# Patient Record
Sex: MALE | Race: WHITE | NOT HISPANIC OR LATINO | URBAN - METROPOLITAN AREA
[De-identification: names, ages, dates, MRNs, and addresses within clinical notes are randomized per-mention and may not be internally consistent; named-entity substitution may affect disease eponyms.]

---

## 2018-12-27 ENCOUNTER — OFFICE VISIT (OUTPATIENT)
Dept: URBAN - METROPOLITAN AREA CLINIC 45 | Facility: CLINIC | Age: 78
End: 2018-12-27
Payer: MEDICARE

## 2018-12-27 DIAGNOSIS — H10.13 ACUTE ATOPIC CONJUNCTIVITIS, BILATERAL: ICD-10-CM

## 2018-12-27 DIAGNOSIS — E11.3292 TYPE 2 DIAB W MILD NONPRLF DIABETIC RTNOP W/O MACULAR EDEMA, LEFT EYE: Primary | ICD-10-CM

## 2018-12-27 PROCEDURE — 92134 CPTRZ OPH DX IMG PST SGM RTA: CPT | Performed by: OPTOMETRIST

## 2018-12-27 PROCEDURE — 92014 COMPRE OPH EXAM EST PT 1/>: CPT | Performed by: OPTOMETRIST

## 2018-12-27 RX ORDER — OLOPATADINE HYDROCHLORIDE 7 MG/ML
0.7 % SOLUTION OPHTHALMIC
Qty: 1 | Refills: 4 | Status: INACTIVE
Start: 2018-12-27 | End: 2020-08-19

## 2018-12-27 ASSESSMENT — INTRAOCULAR PRESSURE
OD: 20
OS: 21

## 2018-12-27 ASSESSMENT — KERATOMETRY
OS: 45.25
OD: 49.88

## 2018-12-27 ASSESSMENT — VISUAL ACUITY
OD: 20/25
OS: 20/25

## 2018-12-27 NOTE — IMPRESSION/PLAN
Impression: Type 2 diab w mild nonprlf diabetic rtnop w/o macular edema, left eye: Y68.1012. Plan: Diabetes type II: mild background diabetic retinopathy, no signs of neovascularization noted. No treatment necessary at this time. Patient was instructed to monitor vision for sudden changes and to call if visual changes noted. Discussed ocular and systemic benefits of blood sugar control.

## 2018-12-27 NOTE — IMPRESSION/PLAN
Impression: Acute atopic conjunctivitis, bilateral: H10.13. Plan: pt to start Pazeo 1 gtt qam ou. Educated patient on risks of non-compliance, side effects, benefits and anticipated results, pt understands.

## 2020-08-19 ENCOUNTER — NEW PATIENT (OUTPATIENT)
Dept: URBAN - METROPOLITAN AREA CLINIC 56 | Facility: CLINIC | Age: 80
End: 2020-08-19
Payer: COMMERCIAL

## 2020-08-19 DIAGNOSIS — E11.3293 DIABETES MELLITUS TYPE 2 WITH MILD NON-PROLIFERATI: ICD-10-CM

## 2020-08-19 DIAGNOSIS — H40.023 OPEN ANGLE WITH BORDERLINE FINDINGS, HIGH RISK, BILATERAL: Primary | ICD-10-CM

## 2020-08-19 DIAGNOSIS — Z79.4 LONG TERM (CURRENT) USE OF INSULIN: ICD-10-CM

## 2020-08-19 DIAGNOSIS — E11.39 TYPE 2 DIABETES MELLITUS WITH OPHTHALMIC COMPLICAT: ICD-10-CM

## 2020-08-19 PROCEDURE — 92250 FUNDUS PHOTOGRAPHY W/I&R: CPT | Performed by: OPTOMETRIST

## 2020-08-19 PROCEDURE — 92004 COMPRE OPH EXAM NEW PT 1/>: CPT | Performed by: OPTOMETRIST

## 2020-08-19 PROCEDURE — 92015 DETERMINE REFRACTIVE STATE: CPT | Performed by: OPTOMETRIST

## 2020-08-19 ASSESSMENT — INTRAOCULAR PRESSURE
OD: 25
OS: 26

## 2020-08-19 ASSESSMENT — KERATOMETRY
OS: 45.30
OD: 45.49

## 2020-08-19 ASSESSMENT — VISUAL ACUITY
OD: 20/20
OS: 20/20

## 2020-09-02 ENCOUNTER — FOLLOW UP ESTABLISHED (OUTPATIENT)
Dept: URBAN - METROPOLITAN AREA CLINIC 56 | Facility: CLINIC | Age: 80
End: 2020-09-02
Payer: COMMERCIAL

## 2020-09-02 PROCEDURE — 92012 INTRM OPH EXAM EST PATIENT: CPT | Performed by: OPTOMETRIST

## 2020-09-02 PROCEDURE — 92083 EXTENDED VISUAL FIELD XM: CPT | Performed by: OPTOMETRIST

## 2020-09-02 PROCEDURE — 76514 ECHO EXAM OF EYE THICKNESS: CPT | Performed by: OPTOMETRIST

## 2020-09-02 ASSESSMENT — INTRAOCULAR PRESSURE
OD: 16
OS: 16

## 2020-12-28 ENCOUNTER — FOLLOW UP ESTABLISHED (OUTPATIENT)
Dept: URBAN - METROPOLITAN AREA CLINIC 56 | Facility: CLINIC | Age: 80
End: 2020-12-28
Payer: COMMERCIAL

## 2020-12-28 PROCEDURE — 92083 EXTENDED VISUAL FIELD XM: CPT | Performed by: OPTOMETRIST

## 2020-12-28 PROCEDURE — 92012 INTRM OPH EXAM EST PATIENT: CPT | Performed by: OPTOMETRIST

## 2020-12-28 ASSESSMENT — INTRAOCULAR PRESSURE
OS: 18
OD: 18

## 2021-04-30 ENCOUNTER — OFFICE VISIT (OUTPATIENT)
Dept: URBAN - METROPOLITAN AREA CLINIC 56 | Facility: CLINIC | Age: 81
End: 2021-04-30
Payer: COMMERCIAL

## 2021-04-30 DIAGNOSIS — H40.1131 PRIMARY OPEN-ANGLE GLAUCOMA, BILATERAL, MILD STAGE: Primary | ICD-10-CM

## 2021-04-30 PROCEDURE — 99214 OFFICE O/P EST MOD 30 MIN: CPT | Performed by: OPTOMETRIST

## 2021-04-30 PROCEDURE — 92083 EXTENDED VISUAL FIELD XM: CPT | Performed by: OPTOMETRIST

## 2021-04-30 PROCEDURE — 92133 CPTRZD OPH DX IMG PST SGM ON: CPT | Performed by: OPTOMETRIST

## 2021-04-30 RX ORDER — LATANOPROST 50 UG/ML
0.005 % SOLUTION OPHTHALMIC
Qty: 2.5 | Refills: 1 | Status: INACTIVE
Start: 2021-04-30 | End: 2021-07-09

## 2021-04-30 ASSESSMENT — INTRAOCULAR PRESSURE
OD: 20
OS: 22

## 2021-04-30 NOTE — IMPRESSION/PLAN
Impression: Primary open-angle glaucoma, bilateral, mild stage: H40.1131. OCT RNFL (4/30/21): OD: normal GCA/abnormal RNFL; OS: normal GCA/abnormal RNFL
VF (4/30/21): OD: unreliable, high FP; OS: reliable, superior field defect Plan: Discussed glaucoma with patient. Recommend lowering IOP. Emphasized and explained compliance. Poor compliance can lead to blindness. Call with any changes in vision, sudden onset of pain or new symptoms. 
Start Latanoprost qhs OU
RTC in 4-6 weeks for IOP check

## 2021-05-28 ENCOUNTER — OFFICE VISIT (OUTPATIENT)
Dept: URBAN - METROPOLITAN AREA CLINIC 56 | Facility: CLINIC | Age: 81
End: 2021-05-28
Payer: COMMERCIAL

## 2021-05-28 PROCEDURE — 99213 OFFICE O/P EST LOW 20 MIN: CPT | Performed by: OPTOMETRIST

## 2021-05-28 ASSESSMENT — INTRAOCULAR PRESSURE
OS: 16
OD: 15

## 2021-05-28 NOTE — IMPRESSION/PLAN
Impression: Primary open-angle glaucoma, bilateral, mild stage: H40.1131. 
OCT RNFL (4/30/21): OD: normal GCA/abnormal RNFL; OS: normal GCA/abnormal RNFL
VF (4/30/21): OD: unreliable, high FP; OS: reliable, superior field defect Plan: Continue Latanoprost qhs OU
RTC in 4 months for CE, RNFL/GCA and disc photos

## 2021-09-28 ENCOUNTER — OFFICE VISIT (OUTPATIENT)
Dept: URBAN - METROPOLITAN AREA CLINIC 56 | Facility: CLINIC | Age: 81
End: 2021-09-28
Payer: COMMERCIAL

## 2021-09-28 DIAGNOSIS — E11.9 TYPE 2 DIABETES MELLITUS W/O COMPLICATION: ICD-10-CM

## 2021-09-28 DIAGNOSIS — Z96.1 PRESENCE OF INTRAOCULAR LENS: ICD-10-CM

## 2021-09-28 DIAGNOSIS — H52.4 PRESBYOPIA: ICD-10-CM

## 2021-09-28 PROCEDURE — 92133 CPTRZD OPH DX IMG PST SGM ON: CPT | Performed by: OPTOMETRIST

## 2021-09-28 PROCEDURE — 92014 COMPRE OPH EXAM EST PT 1/>: CPT | Performed by: OPTOMETRIST

## 2021-09-28 PROCEDURE — 92250 FUNDUS PHOTOGRAPHY W/I&R: CPT | Performed by: OPTOMETRIST

## 2021-09-28 RX ORDER — LATANOPROST 50 UG/ML
0.005 % SOLUTION OPHTHALMIC
Qty: 7.5 | Refills: 3 | Status: ACTIVE
Start: 2021-09-28

## 2021-09-28 ASSESSMENT — VISUAL ACUITY
OS: 20/20
OD: 20/20

## 2021-09-28 ASSESSMENT — INTRAOCULAR PRESSURE
OD: 12
OS: 12

## 2021-09-28 ASSESSMENT — KERATOMETRY
OD: 45.62
OS: 45.07

## 2021-09-28 NOTE — IMPRESSION/PLAN
Impression: Primary open-angle glaucoma, bilateral, mild stage: H40.1131. OCT RNFL (9/28/21): OD: normal GCA/abnormal RNFL; OS: normal GCA/abnormal RNFL
VF (4/30/21): OD: unreliable, high FP; OS: reliable, superior field defect Plan: IOP and testing are stable. 
Continue Latanoprost qhs OU
RTC in 6 months IOP, possible VF 24-2 OCT RNFL/GCA

## 2021-09-28 NOTE — IMPRESSION/PLAN
Impression: Type 2 diabetes mellitus w/o complication: J64.9. Plan: No diabetic retinopathy. Recommend yearly diabetic eye exam. Discussed with patient importance of good blood sugar control.

## 2022-06-27 ENCOUNTER — OFFICE VISIT (OUTPATIENT)
Dept: URBAN - METROPOLITAN AREA CLINIC 56 | Facility: CLINIC | Age: 82
End: 2022-06-27
Payer: COMMERCIAL

## 2022-06-27 DIAGNOSIS — H40.1131 PRIMARY OPEN-ANGLE GLAUCOMA, BILATERAL, MILD STAGE: Primary | ICD-10-CM

## 2022-06-27 PROCEDURE — 92083 EXTENDED VISUAL FIELD XM: CPT | Performed by: OPTOMETRIST

## 2022-06-27 PROCEDURE — 99213 OFFICE O/P EST LOW 20 MIN: CPT | Performed by: OPTOMETRIST

## 2022-06-27 PROCEDURE — 92133 CPTRZD OPH DX IMG PST SGM ON: CPT | Performed by: OPTOMETRIST

## 2022-06-27 RX ORDER — LATANOPROST 50 UG/ML
0.005 % SOLUTION OPHTHALMIC
Qty: 7.5 | Refills: 3 | Status: ACTIVE
Start: 2022-06-27

## 2022-06-27 ASSESSMENT — INTRAOCULAR PRESSURE
OS: 14
OD: 13

## 2022-06-27 NOTE — IMPRESSION/PLAN
Impression: Primary open-angle glaucoma, bilateral, mild stage: H40.1131. Plan: IOP and testing are stable. 
Continue Latanoprost qhs OU
RTC in 3 months for CE, RNFL/GCA

## 2022-09-26 ENCOUNTER — OFFICE VISIT (OUTPATIENT)
Dept: URBAN - METROPOLITAN AREA CLINIC 56 | Facility: CLINIC | Age: 82
End: 2022-09-26
Payer: COMMERCIAL

## 2022-09-26 DIAGNOSIS — Z79.4 LONG TERM (CURRENT) USE OF INSULIN: ICD-10-CM

## 2022-09-26 DIAGNOSIS — H40.1131 PRIMARY OPEN-ANGLE GLAUCOMA, BILATERAL, MILD STAGE: Primary | ICD-10-CM

## 2022-09-26 DIAGNOSIS — E11.9 TYPE 2 DIABETES MELLITUS WITHOUT COMPLICATIONS: ICD-10-CM

## 2022-09-26 DIAGNOSIS — Z96.1 PRESENCE OF INTRAOCULAR LENS: ICD-10-CM

## 2022-09-26 DIAGNOSIS — H43.813 VITREOUS DEGENERATION, BILATERAL: ICD-10-CM

## 2022-09-26 PROCEDURE — 92014 COMPRE OPH EXAM EST PT 1/>: CPT | Performed by: OPTOMETRIST

## 2022-09-26 PROCEDURE — 92133 CPTRZD OPH DX IMG PST SGM ON: CPT | Performed by: OPTOMETRIST

## 2022-09-26 ASSESSMENT — KERATOMETRY
OD: 45.59
OS: 44.92

## 2022-09-26 ASSESSMENT — VISUAL ACUITY
OS: 20/20
OD: 20/20

## 2022-09-26 ASSESSMENT — INTRAOCULAR PRESSURE
OS: 18
OD: 16

## 2022-09-26 NOTE — IMPRESSION/PLAN
Impression: Primary open-angle glaucoma, bilateral, mild stage: H40.1131. Plan: IOP slightly elevated (did not take Latanoprost for last week when on vacation). Testing is stable. 
Continue Latanoprost qhs OU
RTC 6 month IOP and VF 24-2

## 2022-09-26 NOTE — IMPRESSION/PLAN
Impression: Type 2 diabetes mellitus without complications: B95.3. Plan: No diabetic retinopathy. Recommend yearly diabetic eye exam. Discussed with patient importance of good blood sugar control.